# Patient Record
Sex: MALE | Race: WHITE | Employment: FULL TIME | URBAN - METROPOLITAN AREA
[De-identification: names, ages, dates, MRNs, and addresses within clinical notes are randomized per-mention and may not be internally consistent; named-entity substitution may affect disease eponyms.]

---

## 2017-10-21 ENCOUNTER — TELEPHONE (OUTPATIENT)
Dept: FAMILY MEDICINE CLINIC | Age: 67
End: 2017-10-21

## 2017-10-21 ENCOUNTER — HOSPITAL ENCOUNTER (EMERGENCY)
Age: 67
End: 2017-10-21
Attending: FAMILY MEDICINE
Payer: COMMERCIAL

## 2017-10-21 DIAGNOSIS — I46.9 SUDDEN CARDIAC DEATH (HCC): ICD-10-CM

## 2017-10-21 DIAGNOSIS — I46.9 ASYSTOLE (HCC): Primary | ICD-10-CM

## 2017-10-21 PROCEDURE — 92950 HEART/LUNG RESUSCITATION CPR: CPT

## 2017-10-21 PROCEDURE — 99285 EMERGENCY DEPT VISIT HI MDM: CPT

## 2017-10-21 PROCEDURE — 6360000002 HC RX W HCPCS: Performed by: FAMILY MEDICINE

## 2017-10-21 PROCEDURE — 96374 THER/PROPH/DIAG INJ IV PUSH: CPT

## 2017-10-21 RX ADMIN — EPINEPHRINE 1 MG: 0.1 INJECTION, SOLUTION ENDOTRACHEAL; INTRACARDIAC; INTRAVENOUS at 22:43

## 2017-10-21 RX ADMIN — EPINEPHRINE 1 MG: 0.1 INJECTION, SOLUTION ENDOTRACHEAL; INTRACARDIAC; INTRAVENOUS at 22:37

## 2017-10-21 RX ADMIN — EPINEPHRINE 1 MG: 0.1 INJECTION, SOLUTION ENDOTRACHEAL; INTRACARDIAC; INTRAVENOUS at 22:40

## 2017-10-21 ASSESSMENT — ENCOUNTER SYMPTOMS
SHORTNESS OF BREATH: 0
VOMITING: 1

## 2017-10-22 NOTE — FLOWSHEET NOTE
10/21/17 0472   Encounter Summary   Services provided to: Family   Referral/Consult From: Nursing Supervisor/Manager   Support System Children;Spouse   Continue Visiting No  (10/21/17)   Complexity of Encounter High   Length of Encounter 1 hour;30 minutes   Routine   Type Initial   Assessment Grieving;Tearful   Intervention Prayer;Ritual   Outcome Expressed gratitude;Engaged in conversation   Spiritual/Confucianism   Type Spiritual support   10/21/17-Patient CODED in the ED and this staff was called to be with the family. This staff offered support and words of consolation for the family. Called their [facility]  for [facility]. Serafin with the final documents. Offered continued support for the family.

## 2017-10-22 NOTE — ED PROVIDER NOTES
ALLERGIES     has no allergies on file. FAMILY HISTORY     has no family status information on file. family history is not on file. SOCIAL HISTORY          PHYSICAL EXAM     INITIAL VITALS:  vitals were not taken for this visit. Physical Exam   Constitutional: He appears well-developed and well-nourished. GCS 3    Apneic    Pulseless    CPR in progress       HENT:   The head is cyanotic    Gastric contents in the mouth which were suctioned       Eyes:   Pupils equal,  widely dilated and unresponsive to light   Neck: Normal range of motion. Neck supple. No JVD present. Cardiovascular:   No audible heart tones    No palpable pulses   Pulmonary/Chest:   No respiratory effort       Abdominal:   Obese abdomen, distended   Musculoskeletal: He exhibits no edema. Neurological:   unresponsive   Skin: There is pallor. Cyanosis of the head    Peripheral pallor   Nursing note and vitals reviewed. DIFFERENTIAL DIAGNOSIS:   Sudden event causing rapid unresponsiveness    Consider acute cardiac event such as MI, pulmonary embolism, aortic rupture    Asystole on arrival      DIAGNOSTIC RESULTS       LABS:   Labs Reviewed - No data to display    EMERGENCY DEPARTMENT COURSE:   Vitals: There were no vitals filed for this visit. 10:48 PM: The patient was seen and evaluated. Nursing notes reviewed    Patient asystole on arrival    CPR continued    Gastric contents and the oropharynx were suctioned out    Intubation was attempted however trachea was very anterior, and unsuccessful    He was bag with oxygen CPR continued    The patient remained asystole    Efforts were stopped and patient declared dead at 2245 on 10/21/17     Dr. Milagros Garland notified      Dr. Bienvenido Campbell, covering for Dr. Ca Padilla was also notified and will sign the death certificate.       Family present in the ER        CRITICAL CARE:   Due to the immediate potential for life-threatening deterioration due to Acute cardiopulmonary arrest, asystole , I spent 20 minutes providing critical care. This time is excluding time spent performing procedures. CONSULTS:  Dr. Emiliano Parrish:  None    FINAL IMPRESSION      1. Asystole ([facility] [address]    2. Sudden cardiac death ([facility] [address]          DISPOSITION/PLAN    at 2245 on 10/21/17    PATIENT REFERRED TO:  No follow-up provider specified. DISCHARGE MEDICATIONS:  New Prescriptions    No medications on file       (Please note that portions of this note were completed with a voice recognition program.  Efforts were made to edit the dictations but occasionally words are mis-transcribed.)    Scribe:  Arnie Tyler 10/21/17 10:48 PM Scribing for and in the presence of Gin Parra MD.    Signed by: Fide Phillips, 10/21/17 11:19 PM    Provider:  I personally performed the services described in the documentation, reviewed and edited the documentation which was dictated to the scribe in my presence, and it accurately records my words and actions.     Gin Parra MD 10/21/17 11:19 PM       Gin Parra MD  10/21/17 2500

## 2017-10-22 NOTE — TELEPHONE ENCOUNTER
Telephone call received from Dr. Deloise Schwab. Pt apparently told wife he was going to bed at ~ 8 pm this evening. Wife noted light still on shortly later and went to check on pt. Pt told wife to \"call someone\". Wife then called family member. EMS notified. Wife went back to check on pt and noted agonal breathing. EMS/ ED unable to revive pt. Pt  at 10:45 per Dr. Deloise Schwab.  apparently contacted and thought not a coroners case and wife did not desire autopsy. When asked, ED physician thought most likely cause of death was cardiac in etiology based on presentation to ED.   ES